# Patient Record
Sex: FEMALE | Race: WHITE | HISPANIC OR LATINO | URBAN - METROPOLITAN AREA
[De-identification: names, ages, dates, MRNs, and addresses within clinical notes are randomized per-mention and may not be internally consistent; named-entity substitution may affect disease eponyms.]

---

## 2024-01-13 ENCOUNTER — OFFICE VISIT (OUTPATIENT)
Dept: URBAN - METROPOLITAN AREA TELEHEALTH 2 | Facility: TELEHEALTH | Age: 59
End: 2024-01-13
Payer: COMMERCIAL

## 2024-01-13 DIAGNOSIS — K21.9 GERD WITHOUT ESOPHAGITIS: ICD-10-CM

## 2024-01-13 DIAGNOSIS — K57.90 DIVERTICULOSIS: ICD-10-CM

## 2024-01-13 DIAGNOSIS — R14.0 ABDOMINAL BLOATING: ICD-10-CM

## 2024-01-13 DIAGNOSIS — E88.819 INSULIN RESISTANCE: ICD-10-CM

## 2024-01-13 PROBLEM — 397881000: Status: ACTIVE | Noted: 2024-01-13

## 2024-01-13 PROCEDURE — 99204 OFFICE O/P NEW MOD 45 MIN: CPT | Performed by: INTERNAL MEDICINE

## 2024-01-13 RX ORDER — LOSARTAN POTASSIUM 50 MG/1
1 TABLET TABLET, FILM COATED ORAL ONCE A DAY
Status: ACTIVE | COMMUNITY
Start: 2024-01-13

## 2024-01-13 RX ORDER — ROSUVASTATIN CALCIUM 10 MG/1
1 TABLET TABLET, COATED ORAL ONCE A DAY
Status: ACTIVE | COMMUNITY
Start: 2024-01-13

## 2024-01-13 NOTE — HPI-TODAY'S VISIT:
This is a Telehealth OV to which patient has agreed to. Limitations of TH discussed; patient understands and agrees to proceed. Pt's at home during this virtual OV. Patient referred by Jelena Tracy NP for evaluation of abdominal pain. Copy of this consult OV sent to Jelena Tracy NP. 59 yo pt, originally from [facility], in the  x 5 years, seen by Dr. Lainez 2021 for p-prandial dyspepsia and abdominal pain. EGD:  Hp + CAG, Rx'd w Talicia w negative p-Rx UBT. Colonoscopy 2021: diverticulosis. Today, she reports intermittent p-prandial dyspepsia, gas and bloating wo demetrius YANG sxs and no dysphagia / odynophagia nor cardiorespiratory sxs. Has been on Pantoprazole 40 mg po qd. She reports that for past few days, sxs have been gradually improving. Has been on elimination,, gluten restrictive diet w good results. Has been eating bread made without yeast and drinking  coconut water. On on-demand AAs. She has normal bm's without straining, constipation, diarrhea nor melenic stools or hematochezia. She was in [facility] 2 mos ago; while there she had significant p-prandial abdominal pain, distention, gas and bloating wo diarrhea, nausea or emesis. Saw a local MD: Kaye w insulin resistance and advised to start Metformin 500 po qd, which she hasn't  taken it as yet. Self reported weight: 70 kg. No other complaints.

## 2024-01-16 ENCOUNTER — TELEPHONE ENCOUNTER (OUTPATIENT)
Dept: URBAN - METROPOLITAN AREA CLINIC 98 | Facility: CLINIC | Age: 59
End: 2024-01-16

## 2024-01-16 RX ORDER — FAMOTIDINE 40 MG/1
1 TABLET AT BEDTIME TABLET, FILM COATED ORAL ONCE A DAY
Qty: 30 | OUTPATIENT
Start: 2024-01-16

## 2024-01-17 ENCOUNTER — TELEPHONE ENCOUNTER (OUTPATIENT)
Dept: URBAN - METROPOLITAN AREA CLINIC 98 | Facility: CLINIC | Age: 59
End: 2024-01-17

## 2024-01-18 ENCOUNTER — TELEPHONE ENCOUNTER (OUTPATIENT)
Dept: URBAN - METROPOLITAN AREA CLINIC 98 | Facility: CLINIC | Age: 59
End: 2024-01-18

## 2024-01-18 RX ORDER — LACTOBACIL 2/BIFIDO 1/S.THERMO 450B CELL
AS DIRECTED PACKET (EA) ORAL TWICE A DAY
Qty: 60 CAPSULE | Refills: 3 | OUTPATIENT
Start: 2024-01-19 | End: 2024-05-18

## 2024-01-18 RX ORDER — ROSUVASTATIN CALCIUM 20 MG/1
1 TABLET TABLET, COATED ORAL ONCE A DAY
Qty: 30 | Refills: 3 | OUTPATIENT
Start: 2024-01-19

## 2024-01-27 LAB
A/G RATIO: 1.6
ALBUMIN: 4.1
ALKALINE PHOSPHATASE: 124
ALT (SGPT): 25
ALT: 25
AST (SGOT): 21
AST: 21
BILIRUBIN, TOTAL: 0.4
BUN/CREATININE RATIO: (no result)
BUN: 23
CALCIUM: 9.5
CARBON DIOXIDE, TOTAL: 28
CHLORIDE: 105
CHOL/HDLC RATIO: 5.5
CHOLESTEROL, TOTAL: 248
CREATININE: 1.02
EGFR: 63
FIB 4 INDEX: 1.18
FIB 4 INTERPRETATION: (no result)
GLOBULIN, TOTAL: 2.5
GLUCOSE: 94
HDL CHOLESTEROL: 45
HEMATOCRIT: 36.2
HEMOGLOBIN A1C: 6.2
HEMOGLOBIN: 11.8
IMMUNOGLOBULIN A: 243
INTERPRETATION: (no result)
IRON BIND.CAP.(TIBC): 251
IRON SATURATION: 24
IRON: 60
LDL-CHOLESTEROL: 171
MCH: 27.4
MCHC: 32.6
MCV: 84
MPV: 13.4
NON HDL CHOLESTEROL: 203
PLATELET COUNT: 213
PLATELET COUNT: 213
POTASSIUM: 4.5
PROTEIN, TOTAL: 6.6
RDW: 14
RED BLOOD CELL COUNT: 4.31
SODIUM: 140
TISSUE TRANSGLUTAMINASE AB, IGA: <1
TRIGLYCERIDES: 169
VITAMIN B12: 1629
WHITE BLOOD CELL COUNT: 6.3

## 2024-01-31 LAB — PANCREATIC ELASTASE, FECAL: >500

## 2024-02-14 ENCOUNTER — OV EP (OUTPATIENT)
Dept: URBAN - METROPOLITAN AREA CLINIC 98 | Facility: CLINIC | Age: 59
End: 2024-02-14

## 2024-02-14 ENCOUNTER — OV EP (OUTPATIENT)
Dept: URBAN - METROPOLITAN AREA CLINIC 98 | Facility: CLINIC | Age: 59
End: 2024-02-14
Payer: COMMERCIAL

## 2024-02-14 VITALS
HEART RATE: 82 BPM | WEIGHT: 153.3 LBS | HEIGHT: 63 IN | DIASTOLIC BLOOD PRESSURE: 79 MMHG | SYSTOLIC BLOOD PRESSURE: 121 MMHG | BODY MASS INDEX: 27.16 KG/M2 | TEMPERATURE: 97.1 F

## 2024-02-14 DIAGNOSIS — E88.819 INSULIN RESISTANCE: ICD-10-CM

## 2024-02-14 DIAGNOSIS — R14.0 ABDOMINAL BLOATING: ICD-10-CM

## 2024-02-14 DIAGNOSIS — K57.90 DIVERTICULOSIS: ICD-10-CM

## 2024-02-14 DIAGNOSIS — K21.9 GERD WITHOUT ESOPHAGITIS: ICD-10-CM

## 2024-02-14 PROCEDURE — 99214 OFFICE O/P EST MOD 30 MIN: CPT | Performed by: INTERNAL MEDICINE

## 2024-02-14 RX ORDER — LOSARTAN POTASSIUM 50 MG/1
1 TABLET TABLET, FILM COATED ORAL ONCE A DAY
Status: ACTIVE | COMMUNITY
Start: 2024-01-13

## 2024-02-14 RX ORDER — LACTOBACIL 2/BIFIDO 1/S.THERMO 450B CELL
AS DIRECTED PACKET (EA) ORAL TWICE A DAY
Qty: 60 CAPSULE | Refills: 3 | Status: ACTIVE | COMMUNITY
Start: 2024-01-19 | End: 2024-05-18

## 2024-02-14 RX ORDER — FAMOTIDINE 40 MG/1
1 TABLET AT BEDTIME TABLET, FILM COATED ORAL ONCE A DAY
Qty: 30 | Status: ACTIVE | COMMUNITY
Start: 2024-01-16

## 2024-02-14 RX ORDER — FAMOTIDINE 40 MG/1
1 TABLET AT BEDTIME TABLET, FILM COATED ORAL ONCE A DAY
Qty: 90 TABLET | Refills: 3 | OUTPATIENT
Start: 2024-02-16

## 2024-02-14 RX ORDER — ROSUVASTATIN CALCIUM 10 MG/1
1 TABLET TABLET, COATED ORAL ONCE A DAY
Status: ACTIVE | COMMUNITY
Start: 2024-01-13

## 2024-02-14 RX ORDER — ROSUVASTATIN CALCIUM 20 MG/1
1 TABLET TABLET, COATED ORAL ONCE A DAY
Qty: 30 | Refills: 3 | Status: ACTIVE | COMMUNITY
Start: 2024-01-19

## 2024-02-14 NOTE — HPI-TODAY'S VISIT:
59 yo pt, originally from [facility], in the  x 5 years, seen by Dr. Lainez 2021 for p-prandial dyspepsia and abdominal pain. EGD:  Hp + CAG, Rx'd w Talicia w negative p-Rx UBT. Colonoscopy 2021: diverticulosis. Today, she reports intermittent p-prandial dyspepsia, gas, abdominal distention, epigastric pressure and bloating wo demetrius YANG sxs and no dysphagia / odynophagia nor cardiorespiratory sxs. Has been on Famotidine 40 mg po qd and prn AAs.  Has been on an elimination - gluten restrictive diet w good results. Has been eating bread made without yeast and drinking  coconut water. She has normal bm's without straining, constipation, diarrhea nor melenic stools or hematochezia. Recent labs: , HDL 45, , , HbA1c 6.2 and F0. No other complaints.

## 2024-02-16 PROBLEM — 266435005: Status: ACTIVE | Noted: 2024-01-13

## 2024-03-14 ENCOUNTER — OV EP (OUTPATIENT)
Dept: URBAN - METROPOLITAN AREA CLINIC 98 | Facility: CLINIC | Age: 59
End: 2024-03-14
Payer: COMMERCIAL

## 2024-03-14 VITALS
SYSTOLIC BLOOD PRESSURE: 108 MMHG | WEIGHT: 153.4 LBS | HEART RATE: 87 BPM | DIASTOLIC BLOOD PRESSURE: 77 MMHG | TEMPERATURE: 97.3 F | HEIGHT: 63 IN | BODY MASS INDEX: 27.18 KG/M2

## 2024-03-14 DIAGNOSIS — R05.3 CHRONIC COUGH: ICD-10-CM

## 2024-03-14 DIAGNOSIS — E88.819 INSULIN RESISTANCE: ICD-10-CM

## 2024-03-14 DIAGNOSIS — K57.90 DIVERTICULOSIS: ICD-10-CM

## 2024-03-14 DIAGNOSIS — K21.9 GERD WITHOUT ESOPHAGITIS: ICD-10-CM

## 2024-03-14 PROCEDURE — 99214 OFFICE O/P EST MOD 30 MIN: CPT | Performed by: INTERNAL MEDICINE

## 2024-03-14 RX ORDER — ROSUVASTATIN CALCIUM 10 MG/1
1 TABLET TABLET, COATED ORAL ONCE A DAY
Status: ACTIVE | COMMUNITY
Start: 2024-01-13

## 2024-03-14 RX ORDER — LACTOBACIL 2/BIFIDO 1/S.THERMO 450B CELL
AS DIRECTED PACKET (EA) ORAL TWICE A DAY
Qty: 60 CAPSULE | Refills: 3 | Status: ON HOLD | COMMUNITY
Start: 2024-01-19 | End: 2024-05-18

## 2024-03-14 RX ORDER — ROSUVASTATIN CALCIUM 20 MG/1
1 TABLET TABLET, COATED ORAL ONCE A DAY
Qty: 30 | Refills: 3 | Status: ON HOLD | COMMUNITY
Start: 2024-01-19

## 2024-03-14 RX ORDER — FAMOTIDINE 40 MG/1
1 TABLET AT BEDTIME TABLET, FILM COATED ORAL ONCE A DAY
Qty: 30 | Status: ON HOLD | COMMUNITY
Start: 2024-01-16

## 2024-03-14 RX ORDER — FAMOTIDINE 40 MG/1
1 TABLET AT BEDTIME TABLET, FILM COATED ORAL ONCE A DAY
Qty: 90 TABLET | Refills: 3 | Status: ON HOLD | COMMUNITY
Start: 2024-02-16

## 2024-03-14 RX ORDER — LOSARTAN POTASSIUM 50 MG/1
1 TABLET TABLET, FILM COATED ORAL ONCE A DAY
Status: ON HOLD | COMMUNITY
Start: 2024-01-13

## 2024-03-14 NOTE — HPI-TODAY'S VISIT:
59 yo pt, originally from [facility], in the  x 5 years, seen by Dr. Lainez 2021 for p-prandial dyspepsia and abdominal pain. EGD:  Hp + CAG, Rx'd w Talicia w negative p-Rx UBT. Colonoscopy 2021: diverticulosis. Today, she reports no significant GI sxs. For past 6 mos chronic non-productive - dry cough day and night wo triggers. She also c/o rather persistent pharyngeal pruritus wo sore throat, and no respiratory sxs.  Nocturnal coughing causing insomnia. Her PCP Rx'd w a course of Amoxicillin wo response.  She has tried Allegra and Zyrtec wo improvement. Has been on Famotidine 40 mg po qd, prn AAs, Olmesartan, Metformin. Has been on an elimination - gluten restrictive diet w good results. Has been eating bread made without yeast and drinking  coconut water. She has normal bm's without straining, constipation, diarrhea nor melenic stools or hematochezia. Previous labs: , HDL 45, , , HbA1c 6.2 and F0. No other complaints.